# Patient Record
Sex: FEMALE | Race: WHITE | NOT HISPANIC OR LATINO | Employment: FULL TIME | URBAN - METROPOLITAN AREA
[De-identification: names, ages, dates, MRNs, and addresses within clinical notes are randomized per-mention and may not be internally consistent; named-entity substitution may affect disease eponyms.]

---
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
## 2025-01-24 DIAGNOSIS — G43.009 MIGRAINE WITHOUT AURA AND WITHOUT STATUS MIGRAINOSUS, NOT INTRACTABLE: Primary | ICD-10-CM

## 2025-01-24 RX ORDER — AMITRIPTYLINE HYDROCHLORIDE 10 MG/1
10 TABLET, FILM COATED ORAL NIGHTLY
Qty: 90 TABLET | Refills: 0 | Status: SHIPPED | OUTPATIENT
Start: 2025-01-24 | End: 2026-01-24

## 2025-02-18 ENCOUNTER — PATIENT MESSAGE (OUTPATIENT)
Facility: CLINIC | Age: 20
End: 2025-02-18
Payer: COMMERCIAL

## 2025-02-26 ENCOUNTER — PATIENT MESSAGE (OUTPATIENT)
Facility: CLINIC | Age: 20
End: 2025-02-26
Payer: COMMERCIAL

## 2025-02-28 ENCOUNTER — OFFICE VISIT (OUTPATIENT)
Facility: CLINIC | Age: 20
End: 2025-02-28
Payer: COMMERCIAL

## 2025-02-28 DIAGNOSIS — R06.83 SNORING: ICD-10-CM

## 2025-02-28 DIAGNOSIS — G43.009 MIGRAINE WITHOUT AURA AND WITHOUT STATUS MIGRAINOSUS, NOT INTRACTABLE: Primary | ICD-10-CM

## 2025-02-28 RX ORDER — AMITRIPTYLINE HYDROCHLORIDE 10 MG/1
10 TABLET, FILM COATED ORAL NIGHTLY
Qty: 90 TABLET | Refills: 1 | Status: SHIPPED | OUTPATIENT
Start: 2025-02-28 | End: 2026-02-28

## 2025-02-28 NOTE — PROGRESS NOTES
Established Patient   SUBJECTIVE:  Patient ID: Pina Gaspar   Chief Complaint: No chief complaint on file.    History of Present Illness:  Pina Gaspar is a 19 y.o. female who presents for follow-up of headaches via virtual visit.     The patient location is: [place]  The chief complaint leading to consultation is: headache     Visit type: audiovisual    Face to Face time with patient: 4  15 minutes of total time spent on the encounter, which includes face to face time and non-face to face time preparing to see the patient (eg, review of tests), Obtaining and/or reviewing separately obtained history, Documenting clinical information in the electronic or other health record, Independently interpreting results (not separately reported) and communicating results to the patient/family/caregiver, or Care coordination (not separately reported).     Each patient to whom he or she provides medical services by telemedicine is:  (1) informed of the relationship between the physician and patient and the respective role of any other health care provider with respect to management of the patient; and (2) notified that he or she may decline to receive medical services by telemedicine and may withdraw from such care at any time.        02/28/2025 - Interval History:  Taking elavil 10mg. Working well after just one week. Less frequent and less severe.   Hasn't tried Rizatriptan yet.   Sleep med appt 4/1    Recommendations made at last Office Visit on 12/30/2024:  - preventative: start elavil 10mg. Plan to increase to 25mg at next appointment if tolerating.   - rescue: start rizatriptan 5mg. Can increase to 10mg if tolerates.  - snoring/fatigue: refer to sleep medicine    History of Present Illness:   19 y.o. female with headaches, palpitations, who presents to clinic alone for evaluation of headaches.      Pt has known headaches and currently established with pediatric neurology. Transferring care to adult neurology with me today.  "Current regimen includes: magnesium oxide 400mg (she stopped periactin summer 2024). At their last appointment they discussed amitriptyline or propranolol as possible preventative options in the future. They have decided to avoid topamax d/t sulfa allergy. She did get evaluate by cardiology for palpitations and had negative workup.      Spironolactone 200mg daily for acne.      PMHx negative for TBI, Meningitis, Aneurysms, Kidney Stones, asthma, GI bleed, osteoporosis, CAD/MI, CVA/TIA, DM, cancer, pregnancy         Family Hx positive for Migraines in 2 older sisters (1 also has epilepsy)     Headache History:  Onset - "always gotten headaches" that would be mostly relieved with advil. Then became daily arcelia/senior year of highschool  Previous Hx of HA -   Location/Radiation - bifrontal, temporalis, occipitalis; can be unilateral   Quality - pressure, throbbing/pulsing  Duration - 4hours - 1 day  Intensity (range) - 5/10  Frequency - 4-8/30 ha days per month, 0/30 are debilitating  Triggers - stress   Aggravating Factors - light, noise  Alleviating Factors - nap, dark room, quiet  Recent Changes - improved. Wanting to establish care  Prodrome/Aura - no  HA today? - no  Time of day of most headaches- anytime, middle of the day mostly   Sleep - some trouble falling asleep/anxiety; + snoring, wakes feeling refreshed but only sometimes; but does fall asleep in class sometimes d/t fatigue; resolution of headache with sleep     Associated symptoms with the headache:   Meningeal symptoms - photophobia, phonophobia, exercise intolerance   Nausea/vomitting  Nasal drainage   Visual blurriness   Pallor/flushing  Dizziness   Vertigo  Confusion  Impaired concentration   Pain worsened with physical activity   Neck pain - outside of headaches      Cluster headache symptoms:   Swollen or droopy eyelid  Swelling under or around the eye (may affect both eyes)  Excessive tearing  Red eye  Rhinorrhea or one-sided nasal congestion "   Red, flushed face   Forehead and facial sweating     Symptoms of increased intracranial pressure:   Whooshing sounds   Visual spots/blotches      Basilar migraine symptoms:  Dysarthria  Vertigo  Tinnitus  Hypacusia  Diplopia  Simultaneous visual symptoms in both temporal and nasal fields of both eyes  Ataxia  Reduced level of consciousness  Bilateral paresthesias        Treatments Tried   Tylenol - works but headache comes back  Ibuprofen   Rizatriptan   Periactin  Magnesium   Spironolactone   Elavil 10mg    Current Medications:  Current Medications[1]    Review of Systems - A review of 10+ systems was conducted with pertinent positive and negative findings documented in HPI with all other systems reviewed and negative.    PFSH: Past medical, family, and social history reviewed as documented in chart with pertinent positive medical, family, and social history detailed in HPI.    OBJECTIVE:  Vitals: There were no vitals taken for this visit.     Physical Exam:  Constitutional: she appears well-developed and well-nourished. she is well groomed. NAD.    Review of Data:   Labs:  No visits with results within 6 Month(s) from this visit.   Latest known visit with results is:   Lab Visit on 09/11/2023   Component Date Value Ref Range Status    Sodium 09/11/2023 142  136 - 145 mmol/L Final    Potassium 09/11/2023 4.8  3.5 - 5.1 mmol/L Final    Chloride 09/11/2023 104  95 - 110 mmol/L Final    CO2 09/11/2023 20 (L)  23 - 29 mmol/L Final    Glucose 09/11/2023 111 (H)  70 - 110 mg/dL Final    BUN 09/11/2023 14  5 - 18 mg/dL Final    Creatinine 09/11/2023 0.9  0.5 - 1.4 mg/dL Final    Calcium 09/11/2023 10.2  8.7 - 10.5 mg/dL Final    Anion Gap 09/11/2023 18 (H)  8 - 16 mmol/L Final    eGFR 09/11/2023 SEE COMMENT  >60 mL/min/1.73 m^2 Final    AST 09/11/2023 20  10 - 40 U/L Final    ALT 09/11/2023 12  10 - 44 U/L Final    WBC 09/11/2023 8.48  4.50 - 13.50 K/uL Final    RBC 09/11/2023 4.08 (L)  4.10 - 5.10 M/uL Final     Hemoglobin 09/11/2023 13.0  12.0 - 16.0 g/dL Final    Hematocrit 09/11/2023 36.7  36.0 - 46.0 % Final    MCV 09/11/2023 90  78 - 98 fL Final    MCH 09/11/2023 31.9  25.0 - 35.0 pg Final    MCHC 09/11/2023 35.4  31.0 - 37.0 g/dL Final    RDW 09/11/2023 13.7  11.5 - 14.5 % Final    Platelets 09/11/2023 228  150 - 450 K/uL Final    MPV 09/11/2023 10.1  9.2 - 12.9 fL Final    Immature Granulocytes 09/11/2023 0.1  0.0 - 0.5 % Final    Gran # (ANC) 09/11/2023 5.9  1.8 - 8.0 K/uL Final    Immature Grans (Abs) 09/11/2023 0.01  0.00 - 0.04 K/uL Final    Lymph # 09/11/2023 2.0  1.2 - 5.8 K/uL Final    Mono # 09/11/2023 0.5  0.2 - 0.8 K/uL Final    Eos # 09/11/2023 0.0  0.0 - 0.4 K/uL Final    Baso # 09/11/2023 0.05  0.01 - 0.05 K/uL Final    nRBC 09/11/2023 0  0 /100 WBC Final    Gran % 09/11/2023 69.9 (H)  40.0 - 59.0 % Final    Lymph % 09/11/2023 23.5 (L)  27.0 - 45.0 % Final    Mono % 09/11/2023 5.5  4.1 - 12.3 % Final    Eosinophil % 09/11/2023 0.4  0.0 - 4.0 % Final    Basophil % 09/11/2023 0.6  0.0 - 0.7 % Final    Differential Method 09/11/2023 Automated   Final    TSH 09/11/2023 0.699  0.400 - 4.000 uIU/mL Final    Free T4 09/11/2023 0.81  0.71 - 1.51 ng/dL Final     Imaging:  No results found for this or any previous visit.  Note: I have independently reviewed any/all imaging/labs/tests and agree with the report (s) as documented.  Any discrepancies will be as noted/demarcated by free text.  MBS, FNP-C 2/28/2025    ASSESSMENT:  1. Migraine without aura and without status migrainosus, not intractable    2. Snoring            PLAN:  - preventative: continue Elavil 10mg  - rescue: continue rizatriptan prn   - snoring/fatigue: appt with sleep medicine 4/21 as untreated RAMBO can cause/exacerbate headaches/migraines  - Continue tracking headaches   - Discussed goals of therapy are to decrease the frequency, intensity, and duration of headaches  - Risks, benefits, and potential side effects of elavil discussed  - Alternative  treatment options offered   - RTC in 4 months or sooner if needed. If stable at that time, can do annual visits.          Patient Education:  In today's clinic visit we provided patient with education on their headache diagnosis, pathophysiology, triggers, aggravating and improving factors, risk factors for chronification of migraine, preventive management, non-pharmacological management and proper use of acute management medications.   Patient expressed understanding and all questions were answered.  Lifestyle modifications including healthy regular eating, avoiding dehydration, avoiding more than 1 caffeinated product a day, establishing routing sleep patterns at least 8 hours of sleep and avoiding oversleeping and working on relaxation and stressors.     Discussed potential for teratogenicity with treatment, patient understands if her family planning status should change she will contact office immediately and we will safely adjust medications as needed.       CC: RU Stanley MD Monique Smith, P-C  [facility]   [phone]    Dr. Rico was available during today's encounter.            [1]   Current Outpatient Medications:     amitriptyline (ELAVIL) 10 MG tablet, Take 1 tablet (10 mg total) by mouth every evening., Disp: 90 tablet, Rfl: 0    magnesium oxide (MAG-OX) 400 mg (241.3 mg magnesium) tablet, Take 400 mg by mouth once daily., Disp: , Rfl:     riboflavin, vitamin B2, (RIBOFLAVIN ORAL), Take 400 mg by mouth once daily., Disp: , Rfl:     rizatriptan (MAXALT) 5 MG tablet, Take 1 tablet (5 mg total) by mouth 2 (two) times daily as needed for Migraine., Disp: 10 tablet, Rfl: 2    spironolactone (ALDACTONE) 50 MG tablet, Take 50 mg by mouth 2 (two) times daily. (Patient taking differently: Take 100 mg by mouth 2 (two) times daily.), Disp: , Rfl:

## 2025-04-04 ENCOUNTER — LAB VISIT (OUTPATIENT)
Dept: LAB | Facility: OTHER | Age: 20
End: 2025-04-04
Attending: DERMATOLOGY
Payer: COMMERCIAL

## 2025-04-04 DIAGNOSIS — Z79.899 ENCOUNTER FOR LONG-TERM (CURRENT) USE OF MEDICATIONS: ICD-10-CM

## 2025-04-04 DIAGNOSIS — L70.0 NODULAR ELASTOSIS WITH CYSTS AND COMEDONES OF FAVRE AND RACOUCHOT: Primary | ICD-10-CM

## 2025-04-04 DIAGNOSIS — L57.8 NODULAR ELASTOSIS WITH CYSTS AND COMEDONES OF FAVRE AND RACOUCHOT: Primary | ICD-10-CM

## 2025-04-04 LAB
ALT SERPL W/O P-5'-P-CCNC: 17 UNIT/L (ref 10–44)
AST SERPL-CCNC: 18 UNIT/L (ref 11–45)
CHOLEST SERPL-MCNC: 122 MG/DL (ref 120–199)
CHOLEST/HDLC SERPL: 1.9 {RATIO} (ref 2–5)
HDLC SERPL-MCNC: 64 MG/DL (ref 40–75)
HDLC SERPL: 52.5 % (ref 20–50)
LDLC SERPL CALC-MCNC: 40.2 MG/DL (ref 63–159)
NONHDLC SERPL-MCNC: 58 MG/DL
TRIGL SERPL-MCNC: 89 MG/DL (ref 30–150)

## 2025-04-04 PROCEDURE — 82465 ASSAY BLD/SERUM CHOLESTEROL: CPT

## 2025-04-04 PROCEDURE — 36415 COLL VENOUS BLD VENIPUNCTURE: CPT

## 2025-04-04 PROCEDURE — 84450 TRANSFERASE (AST) (SGOT): CPT

## 2025-04-04 PROCEDURE — 84460 ALANINE AMINO (ALT) (SGPT): CPT

## 2025-04-07 ENCOUNTER — OFFICE VISIT (OUTPATIENT)
Dept: OBSTETRICS AND GYNECOLOGY | Facility: CLINIC | Age: 20
End: 2025-04-07
Payer: COMMERCIAL

## 2025-04-07 VITALS
SYSTOLIC BLOOD PRESSURE: 108 MMHG | WEIGHT: 124.31 LBS | DIASTOLIC BLOOD PRESSURE: 74 MMHG | BODY MASS INDEX: 22.17 KG/M2

## 2025-04-07 DIAGNOSIS — Z30.41 ORAL CONTRACEPTIVE PILL SURVEILLANCE: ICD-10-CM

## 2025-04-07 DIAGNOSIS — Z86.19 HISTORY OF CANDIDAL VULVOVAGINITIS: ICD-10-CM

## 2025-04-07 DIAGNOSIS — Z11.3 ROUTINE SCREENING FOR STI (SEXUALLY TRANSMITTED INFECTION): Primary | ICD-10-CM

## 2025-04-07 PROCEDURE — 99999 PR PBB SHADOW E&M-EST. PATIENT-LVL III: CPT | Mod: PBBFAC,,, | Performed by: STUDENT IN AN ORGANIZED HEALTH CARE EDUCATION/TRAINING PROGRAM

## 2025-04-07 PROCEDURE — 87591 N.GONORRHOEAE DNA AMP PROB: CPT | Performed by: STUDENT IN AN ORGANIZED HEALTH CARE EDUCATION/TRAINING PROGRAM

## 2025-04-07 RX ORDER — DROSPIRENONE AND ETHINYL ESTRADIOL TABLETS 0.02-3(28)
1 KIT ORAL DAILY
COMMUNITY
Start: 2025-03-28

## 2025-04-07 RX ORDER — FLUCONAZOLE 150 MG/1
150 TABLET ORAL DAILY
Qty: 1 TABLET | Refills: 1 | Status: SHIPPED | OUTPATIENT
Start: 2025-04-07 | End: 2025-04-08

## 2025-04-07 RX ORDER — ISOTRETINOIN 10 MG/1
10 CAPSULE, GELATIN COATED ORAL DAILY
COMMUNITY
Start: 2025-01-12

## 2025-04-07 NOTE — PROGRESS NOTES
HPI:  Pina is a 19 y.o.  seen today for est GYN care  C/o recent breakthrough spotting over the past month or so  Resolving  Prompted when she skipped her period over  Juan Miguel  Also notes yeast infections triggered by intercourse, vicente new partners    Contraception: COCs (from derm; also on accutane, ryan)  STI screening: agrees to gc/ct    Cervical cancer screening: n/a due age 21   Gardasil: 2/2 complete    Breast cancer screening: n/a, due age 40   Colon cancer screening: n/a, due age 45    Family history breast cancer: no  Family history ovarian cancer: no  Family history colon cancer: no    OB History    Para Term  AB Living   0 0 0 0 0 0   SAB IAB Ectopic Multiple Live Births   0 0 0 0 0        /74 (Patient Position: Sitting)   Wt 56.4 kg (124 lb 5.4 oz)   LMP 2025 (Approximate)   BMI 22.17 kg/m²      PE:   APPEARANCE: Well nourished, well developed, no acute distress.  RESPIRATORY: normal respiratory effort  EXTREMITIES: normal ROM, no edema bilaterally  NEURO: alert and oriented, normal gait  PSYCH: normal mood and affect      Diagnosis:  1. Routine screening for STI (sexually transmitted infection)    2. History of candidal vulvovaginitis    3. Oral contraceptive pill surveillance        Plan:     Pina was seen today for establish care and menstrual problem.    Diagnoses and all orders for this visit:    Routine screening for STI (sexually transmitted infection)  -     C. trachomatis/N. gonorrhoeae by AMP DNA    History of candidal vulvovaginitis  -     fluconazole (DIFLUCAN) 150 MG Tab; Take 1 tablet (150 mg total) by mouth once daily. for 1 day    Oral contraceptive pill surveillance    - discussed pros/cons continuous dosing, spotting/BTB common side effect  - discussed scheduled withdrawal bleeds   - reassured contraceptive efficacy unchanged despite spotting  - discussed behavior strategies to reduce occurrence of vaginal candida    F/U for  routine gyn exam or  sooner PRN

## 2025-04-08 ENCOUNTER — E-VISIT (OUTPATIENT)
Dept: OBSTETRICS AND GYNECOLOGY | Facility: CLINIC | Age: 20
End: 2025-04-08
Payer: COMMERCIAL

## 2025-04-08 DIAGNOSIS — N76.0 ACUTE VAGINITIS: Primary | ICD-10-CM

## 2025-04-09 RX ORDER — TINIDAZOLE 500 MG/1
2 TABLET ORAL
Qty: 8 TABLET | Refills: 0 | Status: SHIPPED | OUTPATIENT
Start: 2025-04-09 | End: 2025-04-11

## 2025-04-09 NOTE — PROGRESS NOTES
Patient ID: Pina Gaspar is a 19 y.o. female.    Chief Complaint: General Illness (Entered automatically based on patient selection in [employer].)    The patient initiated a request through [employer] on 4/8/2025 for evaluation and management with a chief complaint of General Illness (Entered automatically based on patient selection in [employer].)     I evaluated the questionnaire submission on 04/09/2025 .    [facility]    4/9/2025  7:42 AM CDT - Filed by Patient   What do you need help with? Vaginal Concerns   Do you agree to participate in an E-Visit? Yes   If you have any of the following symptoms,  please do not complete an E-Visit,  schedule an appointment with your provider: I acknowledge   Do you have any of the following pregnancy-related conditions? None   What is the main issue you would like addressed today? Im worried i may have BV   Which of the following vaginal concerns do you have? Discharge   Do you have vaginal discharge? White/milky discharge   Do you have pain while passing urine? No   Do you have any of the following symptoms? None of the above    Have you taken antibiotics in the last two weeks? No    Do you use any of the following? No   Which of the following applies to your menstrual period? Had in the last 2 weeks   Which of the following applies to your menstrual cycle? Normal amount of bleeding   Do you have spotting between periods? Yes   Do you have pain with your period? No   Have you had similar symptoms in the past? No   Have you had a temperature of 100.4 or higher? No   Provide any additional information you feel is important.    Please attach any relevant images or files    Are you able to take your vital signs? No         Encounter Diagnosis   Name Primary?    Acute vaginitis Yes        No orders of the defined types were placed in this encounter.     Medications Ordered This Encounter   Medications    tinidazole (TINDAMAX) 500 MG tablet     Sig: Take 4 tablets (2 g  total) by mouth daily with breakfast. for 2 days     Dispense:  8 tablet     Refill:  0        No follow-ups on file.      E-Visit Time Tracking:

## 2025-04-11 LAB
C TRACH DNA SPEC QL NAA+PROBE: NOT DETECTED
CTGC SOURCE (OHS) ORD-325: NORMAL
N GONORRHOEA DNA UR QL NAA+PROBE: NOT DETECTED

## 2025-04-22 DIAGNOSIS — G43.009 MIGRAINE WITHOUT AURA AND WITHOUT STATUS MIGRAINOSUS, NOT INTRACTABLE: ICD-10-CM

## 2025-04-22 RX ORDER — AMITRIPTYLINE HYDROCHLORIDE 10 MG/1
10 TABLET, FILM COATED ORAL NIGHTLY
Qty: 90 TABLET | Refills: 1 | OUTPATIENT
Start: 2025-04-22 | End: 2026-04-22

## 2025-04-23 DIAGNOSIS — G43.009 MIGRAINE WITHOUT AURA AND WITHOUT STATUS MIGRAINOSUS, NOT INTRACTABLE: ICD-10-CM

## 2025-04-24 RX ORDER — AMITRIPTYLINE HYDROCHLORIDE 10 MG/1
10 TABLET, FILM COATED ORAL NIGHTLY
Qty: 90 TABLET | Refills: 1 | OUTPATIENT
Start: 2025-04-24 | End: 2026-04-24

## 2025-05-19 DIAGNOSIS — Z86.19 HISTORY OF CANDIDAL VULVOVAGINITIS: ICD-10-CM

## 2025-05-20 RX ORDER — FLUCONAZOLE 150 MG/1
150 TABLET ORAL
Qty: 1 TABLET | Refills: 1 | Status: SHIPPED | OUTPATIENT
Start: 2025-05-20

## 2025-05-20 NOTE — TELEPHONE ENCOUNTER
Refill Routing Note   Medication(s) are not appropriate for processing by [facility] for the following reason(s):        Outside of protocol    [facility] action(s):  Route             Appointments  past 12m or future 3m with PCP    Date Provider   Last Visit   4/8/2025 Shila Ivy MD   Next Visit   6/24/2025 Shila Ivy MD   ED visits in past 90 days: 0        Note composed:10:04 PM 05/19/2025

## 2025-06-04 DIAGNOSIS — G43.009 MIGRAINE WITHOUT AURA AND WITHOUT STATUS MIGRAINOSUS, NOT INTRACTABLE: ICD-10-CM

## 2025-06-04 RX ORDER — AMITRIPTYLINE HYDROCHLORIDE 10 MG/1
10 TABLET, FILM COATED ORAL NIGHTLY
Qty: 90 TABLET | Refills: 1 | Status: SHIPPED | OUTPATIENT
Start: 2025-06-04 | End: 2026-06-04

## 2025-06-24 ENCOUNTER — OFFICE VISIT (OUTPATIENT)
Dept: OBSTETRICS AND GYNECOLOGY | Facility: CLINIC | Age: 20
End: 2025-06-24
Payer: COMMERCIAL

## 2025-06-24 ENCOUNTER — LAB VISIT (OUTPATIENT)
Dept: LAB | Facility: OTHER | Age: 20
End: 2025-06-24
Attending: DERMATOLOGY
Payer: COMMERCIAL

## 2025-06-24 VITALS
BODY MASS INDEX: 22.05 KG/M2 | WEIGHT: 123.69 LBS | SYSTOLIC BLOOD PRESSURE: 100 MMHG | DIASTOLIC BLOOD PRESSURE: 58 MMHG

## 2025-06-24 DIAGNOSIS — L70.0 NODULAR ELASTOSIS WITH CYSTS AND COMEDONES OF FAVRE AND RACOUCHOT: Primary | ICD-10-CM

## 2025-06-24 DIAGNOSIS — Z79.899 ENCOUNTER FOR LONG-TERM (CURRENT) USE OF MEDICATIONS: ICD-10-CM

## 2025-06-24 DIAGNOSIS — N76.0 RECURRENT VAGINITIS: Primary | ICD-10-CM

## 2025-06-24 DIAGNOSIS — L57.8 NODULAR ELASTOSIS WITH CYSTS AND COMEDONES OF FAVRE AND RACOUCHOT: Primary | ICD-10-CM

## 2025-06-24 LAB
ALT SERPL W/O P-5'-P-CCNC: 12 UNIT/L (ref 10–44)
AST SERPL-CCNC: 18 UNIT/L (ref 11–45)
CHOLEST SERPL-MCNC: 128 MG/DL (ref 120–199)
CHOLEST/HDLC SERPL: 2.1 {RATIO} (ref 2–5)
HDLC SERPL-MCNC: 61 MG/DL (ref 40–75)
HDLC SERPL: 47.7 % (ref 20–50)
LDLC SERPL CALC-MCNC: 45.6 MG/DL (ref 63–159)
NONHDLC SERPL-MCNC: 67 MG/DL
TRIGL SERPL-MCNC: 107 MG/DL (ref 30–150)

## 2025-06-24 PROCEDURE — 82465 ASSAY BLD/SERUM CHOLESTEROL: CPT

## 2025-06-24 PROCEDURE — 99999 PR PBB SHADOW E&M-EST. PATIENT-LVL III: CPT | Mod: PBBFAC,,, | Performed by: STUDENT IN AN ORGANIZED HEALTH CARE EDUCATION/TRAINING PROGRAM

## 2025-06-24 PROCEDURE — 81515 NFCT DS BV&VAGINITIS DNA ALG: CPT | Performed by: STUDENT IN AN ORGANIZED HEALTH CARE EDUCATION/TRAINING PROGRAM

## 2025-06-24 PROCEDURE — 3078F DIAST BP <80 MM HG: CPT | Mod: CPTII,S$GLB,, | Performed by: STUDENT IN AN ORGANIZED HEALTH CARE EDUCATION/TRAINING PROGRAM

## 2025-06-24 PROCEDURE — 36415 COLL VENOUS BLD VENIPUNCTURE: CPT

## 2025-06-24 PROCEDURE — 84460 ALANINE AMINO (ALT) (SGPT): CPT

## 2025-06-24 PROCEDURE — 3008F BODY MASS INDEX DOCD: CPT | Mod: CPTII,S$GLB,, | Performed by: STUDENT IN AN ORGANIZED HEALTH CARE EDUCATION/TRAINING PROGRAM

## 2025-06-24 PROCEDURE — 84450 TRANSFERASE (AST) (SGOT): CPT

## 2025-06-24 PROCEDURE — 3074F SYST BP LT 130 MM HG: CPT | Mod: CPTII,S$GLB,, | Performed by: STUDENT IN AN ORGANIZED HEALTH CARE EDUCATION/TRAINING PROGRAM

## 2025-06-24 PROCEDURE — 99213 OFFICE O/P EST LOW 20 MIN: CPT | Mod: S$GLB,,, | Performed by: STUDENT IN AN ORGANIZED HEALTH CARE EDUCATION/TRAINING PROGRAM

## 2025-06-24 PROCEDURE — 1159F MED LIST DOCD IN RCRD: CPT | Mod: CPTII,S$GLB,, | Performed by: STUDENT IN AN ORGANIZED HEALTH CARE EDUCATION/TRAINING PROGRAM

## 2025-06-24 RX ORDER — FLUCONAZOLE 150 MG/1
150 TABLET ORAL
Qty: 3 TABLET | Refills: 0 | Status: SHIPPED | OUTPATIENT
Start: 2025-06-24 | End: 2025-07-01

## 2025-06-25 NOTE — PROGRESS NOTES
"HPI:  Pina is a 19 y.o.  seen today for what she believes to be recurrent yeast infections  Reports "classic" symptoms of marked itching, irrtitation, and clumpy white discharge  She does exercise frequently and admits she does not always have change of clothes readily available  Her exercise programs are high intensity and cause +++perspiration/moisture e.g. hot yoga  Day to day she wears tight fitting athletic clothing  Improves with treatment but sx have returned quickly recenlty   Negatived gc/ct last visit, no new partners    OB History    Para Term  AB Living   0 0 0 0 0 0   SAB IAB Ectopic Multiple Live Births   0 0 0 0 0        BP (!) 100/58   Wt 56.1 kg (123 lb 10.9 oz)   BMI 22.05 kg/m²      PE:   APPEARANCE: Well nourished, well developed, no acute distress.  ABDOMEN: Soft. No tenderness or masses. No distention. No hernias palpated. No CVA tenderness.  VULVA: No lesions. Normal external female genitalia.  URETHRAL MEATUS: Normal size and location, no lesions, no prolapse.  URETHRA: No masses, tenderness, or prolapse.  VAGINA: Moist. No lesions or lacerations noted.  No odor present. Moderate amt thick-lita white discharge. Not obviously clumpy or adherent.   CERVIX: No lesions or discharge. No cervical motion tenderness.   UTERUS: not examined  ADNEXA: not examined  ANUS PERINEUM: Normal.      Diagnosis:  1. Recurrent vaginitis        Plan:   Pina was seen today for vaginitis.    Diagnoses and all orders for this visit:    Recurrent vaginitis  -     Vaginosis Screen by DNA Probe  -     fluconazole (DIFLUCAN) 150 MG Tab; Take 1 tablet (150 mg total) by mouth Every 3 (three) days. for 3 doses    Symptoms and and lifestyle convincing for recurrent candida. Will provide treatment.  More importantly we discussed prevention strategies as her symptoms do resolve with medication, only to return. Specifically discussed showering and changing clothes after exercising. Undergarments made " of natural fibers are best.  Other healthy vulva habits reviewed as well.       F/U for routine gyn exam or sooner PRN

## 2025-06-27 LAB
BACTERIAL VAGINOSIS DNA (OHS): NOT DETECTED
CANDIDA GLABRATA/KRUSEI DNA (OHS): NOT DETECTED
CANDIDA SPECIES DNA (OHS): NOT DETECTED
TRICHOMONAS VAGINALIS DNA (OHS): NOT DETECTED

## 2025-09-04 DIAGNOSIS — G43.009 MIGRAINE WITHOUT AURA AND WITHOUT STATUS MIGRAINOSUS, NOT INTRACTABLE: ICD-10-CM

## 2025-09-04 RX ORDER — AMITRIPTYLINE HYDROCHLORIDE 10 MG/1
10 TABLET, FILM COATED ORAL NIGHTLY
Qty: 90 TABLET | Refills: 2 | Status: SHIPPED | OUTPATIENT
Start: 2025-09-04 | End: 2026-09-04